# Patient Record
Sex: MALE | Race: WHITE | NOT HISPANIC OR LATINO | Employment: FULL TIME | URBAN - METROPOLITAN AREA
[De-identification: names, ages, dates, MRNs, and addresses within clinical notes are randomized per-mention and may not be internally consistent; named-entity substitution may affect disease eponyms.]

---

## 2021-04-30 ENCOUNTER — APPOINTMENT (OUTPATIENT)
Dept: GENERAL RADIOLOGY | Facility: HOSPITAL | Age: 61
End: 2021-04-30

## 2021-04-30 ENCOUNTER — HOSPITAL ENCOUNTER (INPATIENT)
Facility: HOSPITAL | Age: 61
LOS: 2 days | Discharge: HOME | End: 2021-05-02
Attending: EMERGENCY MEDICINE | Admitting: INTERNAL MEDICINE

## 2021-04-30 ENCOUNTER — APPOINTMENT (OUTPATIENT)
Dept: CT IMAGING | Facility: HOSPITAL | Age: 61
End: 2021-04-30

## 2021-04-30 DIAGNOSIS — Z79.01 CHRONIC ANTICOAGULATION: ICD-10-CM

## 2021-04-30 DIAGNOSIS — R42 DIZZINESS: ICD-10-CM

## 2021-04-30 DIAGNOSIS — R06.02 SHORTNESS OF BREATH: ICD-10-CM

## 2021-04-30 DIAGNOSIS — K92.2 GASTROINTESTINAL HEMORRHAGE, UNSPECIFIED GASTROINTESTINAL HEMORRHAGE TYPE: ICD-10-CM

## 2021-04-30 DIAGNOSIS — K25.9 GASTRIC ULCER: ICD-10-CM

## 2021-04-30 DIAGNOSIS — D62 ACUTE BLOOD LOSS ANEMIA: ICD-10-CM

## 2021-04-30 DIAGNOSIS — K62.5 BRBPR (BRIGHT RED BLOOD PER RECTUM): Primary | ICD-10-CM

## 2021-04-30 PROBLEM — I10 ESSENTIAL HYPERTENSION: Status: ACTIVE | Noted: 2021-04-30

## 2021-04-30 PROBLEM — R73.9 HYPERGLYCEMIA: Status: ACTIVE | Noted: 2021-04-30

## 2021-04-30 PROBLEM — I82.409 DVT (DEEP VENOUS THROMBOSIS) (HCC): Status: ACTIVE | Noted: 2021-04-30

## 2021-04-30 LAB
ABO GROUP BLD: NORMAL
ABO GROUP BLD: NORMAL
ALBUMIN SERPL-MCNC: 3.7 G/DL (ref 3.5–5.2)
ALBUMIN/GLOB SERPL: 1.4 G/DL
ALP SERPL-CCNC: 86 U/L (ref 39–117)
ALT SERPL W P-5'-P-CCNC: 11 U/L (ref 1–41)
ANION GAP SERPL CALCULATED.3IONS-SCNC: 11 MMOL/L (ref 5–15)
AST SERPL-CCNC: 16 U/L (ref 1–40)
BASOPHILS # BLD AUTO: 0.03 10*3/MM3 (ref 0–0.2)
BASOPHILS NFR BLD AUTO: 0.3 % (ref 0–1.5)
BILIRUB SERPL-MCNC: 0.3 MG/DL (ref 0–1.2)
BLD GP AB SCN SERPL QL: NEGATIVE
BUN SERPL-MCNC: 42 MG/DL (ref 6–20)
BUN/CREAT SERPL: 38.2 (ref 7–25)
CALCIUM SPEC-SCNC: 8.8 MG/DL (ref 8.6–10.5)
CHLORIDE SERPL-SCNC: 109 MMOL/L (ref 98–107)
CO2 SERPL-SCNC: 24 MMOL/L (ref 22–29)
CREAT SERPL-MCNC: 1.1 MG/DL (ref 0.76–1.27)
D-LACTATE SERPL-SCNC: 2.1 MMOL/L (ref 0.5–2)
DEPRECATED RDW RBC AUTO: 47.4 FL (ref 37–54)
DEVELOPER EXPIRATION DATE: ABNORMAL
DEVELOPER LOT NUMBER: ABNORMAL
EOSINOPHIL # BLD AUTO: 0.01 10*3/MM3 (ref 0–0.4)
EOSINOPHIL NFR BLD AUTO: 0.1 % (ref 0.3–6.2)
ERYTHROCYTE [DISTWIDTH] IN BLOOD BY AUTOMATED COUNT: 13.9 % (ref 12.3–15.4)
EXPIRATION DATE: ABNORMAL
FECAL OCCULT BLOOD SCREEN, POC: POSITIVE
FLUAV RNA RESP QL NAA+PROBE: NOT DETECTED
FLUBV RNA RESP QL NAA+PROBE: NOT DETECTED
GFR SERPL CREATININE-BSD FRML MDRD: 69 ML/MIN/1.73
GLOBULIN UR ELPH-MCNC: 2.6 GM/DL
GLUCOSE SERPL-MCNC: 156 MG/DL (ref 65–99)
HCT VFR BLD AUTO: 32.8 % (ref 37.5–51)
HGB BLD-MCNC: 10.1 G/DL (ref 13–17.7)
HOLD SPECIMEN: NORMAL
IMM GRANULOCYTES # BLD AUTO: 0.05 10*3/MM3 (ref 0–0.05)
IMM GRANULOCYTES NFR BLD AUTO: 0.5 % (ref 0–0.5)
IRON 24H UR-MRATE: 109 MCG/DL (ref 59–158)
IRON SATN MFR SERPL: 24 % (ref 20–50)
LYMPHOCYTES # BLD AUTO: 1.24 10*3/MM3 (ref 0.7–3.1)
LYMPHOCYTES NFR BLD AUTO: 11.8 % (ref 19.6–45.3)
Lab: ABNORMAL
MCH RBC QN AUTO: 28.4 PG (ref 26.6–33)
MCHC RBC AUTO-ENTMCNC: 30.8 G/DL (ref 31.5–35.7)
MCV RBC AUTO: 92.1 FL (ref 79–97)
MONOCYTES # BLD AUTO: 0.42 10*3/MM3 (ref 0.1–0.9)
MONOCYTES NFR BLD AUTO: 4 % (ref 5–12)
NEGATIVE CONTROL: NEGATIVE
NEUTROPHILS NFR BLD AUTO: 8.77 10*3/MM3 (ref 1.7–7)
NEUTROPHILS NFR BLD AUTO: 83.3 % (ref 42.7–76)
NRBC BLD AUTO-RTO: 0 /100 WBC (ref 0–0.2)
NT-PROBNP SERPL-MCNC: 49.7 PG/ML (ref 0–900)
PLATELET # BLD AUTO: 276 10*3/MM3 (ref 140–450)
PMV BLD AUTO: 10.9 FL (ref 6–12)
POSITIVE CONTROL: POSITIVE
POTASSIUM SERPL-SCNC: 4.5 MMOL/L (ref 3.5–5.2)
PROT SERPL-MCNC: 6.3 G/DL (ref 6–8.5)
QT INTERVAL: 316 MS
QTC INTERVAL: 431 MS
RBC # BLD AUTO: 3.56 10*6/MM3 (ref 4.14–5.8)
RH BLD: POSITIVE
RH BLD: POSITIVE
SARS-COV-2 RNA RESP QL NAA+PROBE: NOT DETECTED
SODIUM SERPL-SCNC: 144 MMOL/L (ref 136–145)
T&S EXPIRATION DATE: NORMAL
TIBC SERPL-MCNC: 454 MCG/DL (ref 298–536)
TRANSFERRIN SERPL-MCNC: 305 MG/DL (ref 200–360)
TROPONIN T SERPL-MCNC: <0.01 NG/ML (ref 0–0.03)
WBC # BLD AUTO: 10.52 10*3/MM3 (ref 3.4–10.8)
WHOLE BLOOD HOLD SPECIMEN: NORMAL
WHOLE BLOOD HOLD SPECIMEN: NORMAL

## 2021-04-30 PROCEDURE — 85018 HEMOGLOBIN: CPT | Performed by: INTERNAL MEDICINE

## 2021-04-30 PROCEDURE — 25010000002 IOPAMIDOL 61 % SOLUTION: Performed by: EMERGENCY MEDICINE

## 2021-04-30 PROCEDURE — 80053 COMPREHEN METABOLIC PANEL: CPT | Performed by: EMERGENCY MEDICINE

## 2021-04-30 PROCEDURE — 85025 COMPLETE CBC W/AUTO DIFF WBC: CPT

## 2021-04-30 PROCEDURE — 86901 BLOOD TYPING SEROLOGIC RH(D): CPT

## 2021-04-30 PROCEDURE — 74177 CT ABD & PELVIS W/CONTRAST: CPT

## 2021-04-30 PROCEDURE — 83880 ASSAY OF NATRIURETIC PEPTIDE: CPT | Performed by: EMERGENCY MEDICINE

## 2021-04-30 PROCEDURE — 87636 SARSCOV2 & INF A&B AMP PRB: CPT | Performed by: INTERNAL MEDICINE

## 2021-04-30 PROCEDURE — 82746 ASSAY OF FOLIC ACID SERUM: CPT | Performed by: PHYSICIAN ASSISTANT

## 2021-04-30 PROCEDURE — 93005 ELECTROCARDIOGRAM TRACING: CPT | Performed by: EMERGENCY MEDICINE

## 2021-04-30 PROCEDURE — 86923 COMPATIBILITY TEST ELECTRIC: CPT

## 2021-04-30 PROCEDURE — 99284 EMERGENCY DEPT VISIT MOD MDM: CPT

## 2021-04-30 PROCEDURE — 86901 BLOOD TYPING SEROLOGIC RH(D): CPT | Performed by: EMERGENCY MEDICINE

## 2021-04-30 PROCEDURE — 83540 ASSAY OF IRON: CPT | Performed by: PHYSICIAN ASSISTANT

## 2021-04-30 PROCEDURE — 86900 BLOOD TYPING SEROLOGIC ABO: CPT

## 2021-04-30 PROCEDURE — 82728 ASSAY OF FERRITIN: CPT | Performed by: PHYSICIAN ASSISTANT

## 2021-04-30 PROCEDURE — 93005 ELECTROCARDIOGRAM TRACING: CPT

## 2021-04-30 PROCEDURE — 85014 HEMATOCRIT: CPT | Performed by: INTERNAL MEDICINE

## 2021-04-30 PROCEDURE — 25010000002 DIPHENHYDRAMINE PER 50 MG: Performed by: INTERNAL MEDICINE

## 2021-04-30 PROCEDURE — 86900 BLOOD TYPING SEROLOGIC ABO: CPT | Performed by: EMERGENCY MEDICINE

## 2021-04-30 PROCEDURE — 84466 ASSAY OF TRANSFERRIN: CPT | Performed by: PHYSICIAN ASSISTANT

## 2021-04-30 PROCEDURE — 99223 1ST HOSP IP/OBS HIGH 75: CPT | Performed by: INTERNAL MEDICINE

## 2021-04-30 PROCEDURE — 82607 VITAMIN B-12: CPT | Performed by: PHYSICIAN ASSISTANT

## 2021-04-30 PROCEDURE — 82270 OCCULT BLOOD FECES: CPT | Performed by: EMERGENCY MEDICINE

## 2021-04-30 PROCEDURE — 86850 RBC ANTIBODY SCREEN: CPT | Performed by: EMERGENCY MEDICINE

## 2021-04-30 PROCEDURE — 84484 ASSAY OF TROPONIN QUANT: CPT | Performed by: EMERGENCY MEDICINE

## 2021-04-30 PROCEDURE — 71045 X-RAY EXAM CHEST 1 VIEW: CPT

## 2021-04-30 PROCEDURE — 83605 ASSAY OF LACTIC ACID: CPT | Performed by: PHYSICIAN ASSISTANT

## 2021-04-30 RX ORDER — PANTOPRAZOLE SODIUM 40 MG/10ML
80 INJECTION, POWDER, LYOPHILIZED, FOR SOLUTION INTRAVENOUS ONCE
Status: COMPLETED | OUTPATIENT
Start: 2021-04-30 | End: 2021-04-30

## 2021-04-30 RX ORDER — LISINOPRIL 20 MG/1
20 TABLET ORAL DAILY
COMMUNITY

## 2021-04-30 RX ORDER — ACETAMINOPHEN 325 MG/1
650 TABLET ORAL EVERY 4 HOURS PRN
Status: DISCONTINUED | OUTPATIENT
Start: 2021-04-30 | End: 2021-05-02 | Stop reason: HOSPADM

## 2021-04-30 RX ORDER — CHOLECALCIFEROL (VITAMIN D3) 125 MCG
5 CAPSULE ORAL NIGHTLY PRN
Status: DISCONTINUED | OUTPATIENT
Start: 2021-04-30 | End: 2021-05-02 | Stop reason: HOSPADM

## 2021-04-30 RX ORDER — SODIUM CHLORIDE, SODIUM LACTATE, POTASSIUM CHLORIDE, CALCIUM CHLORIDE 600; 310; 30; 20 MG/100ML; MG/100ML; MG/100ML; MG/100ML
100 INJECTION, SOLUTION INTRAVENOUS CONTINUOUS
Status: ACTIVE | OUTPATIENT
Start: 2021-05-01 | End: 2021-05-01

## 2021-04-30 RX ORDER — FAMOTIDINE 10 MG/ML
20 INJECTION, SOLUTION INTRAVENOUS ONCE
Status: COMPLETED | OUTPATIENT
Start: 2021-04-30 | End: 2021-04-30

## 2021-04-30 RX ORDER — ONDANSETRON 2 MG/ML
4 INJECTION INTRAMUSCULAR; INTRAVENOUS EVERY 6 HOURS PRN
Status: DISCONTINUED | OUTPATIENT
Start: 2021-04-30 | End: 2021-05-02 | Stop reason: HOSPADM

## 2021-04-30 RX ORDER — SODIUM CHLORIDE 0.9 % (FLUSH) 0.9 %
10 SYRINGE (ML) INJECTION AS NEEDED
Status: DISCONTINUED | OUTPATIENT
Start: 2021-04-30 | End: 2021-05-02 | Stop reason: HOSPADM

## 2021-04-30 RX ORDER — DIPHENHYDRAMINE HYDROCHLORIDE 50 MG/ML
25 INJECTION INTRAMUSCULAR; INTRAVENOUS NIGHTLY PRN
Status: DISCONTINUED | OUTPATIENT
Start: 2021-04-30 | End: 2021-05-02 | Stop reason: HOSPADM

## 2021-04-30 RX ORDER — SODIUM CHLORIDE 0.9 % (FLUSH) 0.9 %
10 SYRINGE (ML) INJECTION EVERY 12 HOURS SCHEDULED
Status: DISCONTINUED | OUTPATIENT
Start: 2021-05-01 | End: 2021-05-02 | Stop reason: HOSPADM

## 2021-04-30 RX ORDER — PANTOPRAZOLE SODIUM 40 MG/10ML
40 INJECTION, POWDER, LYOPHILIZED, FOR SOLUTION INTRAVENOUS EVERY 12 HOURS SCHEDULED
Status: DISCONTINUED | OUTPATIENT
Start: 2021-05-01 | End: 2021-05-02

## 2021-04-30 RX ADMIN — SODIUM CHLORIDE, PRESERVATIVE FREE 10 ML: 5 INJECTION INTRAVENOUS at 23:06

## 2021-04-30 RX ADMIN — IOPAMIDOL 95 ML: 612 INJECTION, SOLUTION INTRAVENOUS at 21:34

## 2021-04-30 RX ADMIN — PANTOPRAZOLE SODIUM 80 MG: 40 INJECTION, POWDER, FOR SOLUTION INTRAVENOUS at 21:06

## 2021-04-30 RX ADMIN — DIPHENHYDRAMINE HYDROCHLORIDE 25 MG: 50 INJECTION INTRAMUSCULAR; INTRAVENOUS at 23:22

## 2021-04-30 RX ADMIN — SODIUM CHLORIDE, POTASSIUM CHLORIDE, SODIUM LACTATE AND CALCIUM CHLORIDE 100 ML/HR: 600; 310; 30; 20 INJECTION, SOLUTION INTRAVENOUS at 23:06

## 2021-04-30 RX ADMIN — FAMOTIDINE 20 MG: 10 INJECTION, SOLUTION INTRAVENOUS at 21:05

## 2021-05-01 ENCOUNTER — ANESTHESIA (OUTPATIENT)
Dept: GASTROENTEROLOGY | Facility: HOSPITAL | Age: 61
End: 2021-05-01

## 2021-05-01 ENCOUNTER — ANESTHESIA EVENT (OUTPATIENT)
Dept: GASTROENTEROLOGY | Facility: HOSPITAL | Age: 61
End: 2021-05-01

## 2021-05-01 LAB
ANION GAP SERPL CALCULATED.3IONS-SCNC: 8 MMOL/L (ref 5–15)
BUN SERPL-MCNC: 39 MG/DL (ref 6–20)
BUN/CREAT SERPL: 41.5 (ref 7–25)
CA-I SERPL ISE-MCNC: 1.29 MMOL/L (ref 1.12–1.32)
CALCIUM SPEC-SCNC: 8.2 MG/DL (ref 8.6–10.5)
CHLORIDE SERPL-SCNC: 110 MMOL/L (ref 98–107)
CO2 SERPL-SCNC: 24 MMOL/L (ref 22–29)
CREAT SERPL-MCNC: 0.94 MG/DL (ref 0.76–1.27)
D-LACTATE SERPL-SCNC: 1.4 MMOL/L (ref 0.5–2)
FERRITIN SERPL-MCNC: 22.81 NG/ML (ref 30–400)
FOLATE SERPL-MCNC: 4.17 NG/ML (ref 4.78–24.2)
GFR SERPL CREATININE-BSD FRML MDRD: 82 ML/MIN/1.73
GLUCOSE SERPL-MCNC: 113 MG/DL (ref 65–99)
HBA1C MFR BLD: 5.8 % (ref 4.8–5.6)
HCT VFR BLD AUTO: 27.2 % (ref 37.5–51)
HCT VFR BLD AUTO: 27.6 % (ref 37.5–51)
HCT VFR BLD AUTO: 32.2 % (ref 37.5–51)
HGB BLD-MCNC: 8.4 G/DL (ref 13–17.7)
HGB BLD-MCNC: 8.5 G/DL (ref 13–17.7)
HGB BLD-MCNC: 9.8 G/DL (ref 13–17.7)
POTASSIUM SERPL-SCNC: 4.1 MMOL/L (ref 3.5–5.2)
SODIUM SERPL-SCNC: 142 MMOL/L (ref 136–145)
VIT B12 BLD-MCNC: <150 PG/ML (ref 211–946)

## 2021-05-01 PROCEDURE — 99254 IP/OBS CNSLTJ NEW/EST MOD 60: CPT | Performed by: NURSE PRACTITIONER

## 2021-05-01 PROCEDURE — 85018 HEMOGLOBIN: CPT | Performed by: INTERNAL MEDICINE

## 2021-05-01 PROCEDURE — 25010000002 PROPOFOL 10 MG/ML EMULSION: Performed by: NURSE ANESTHETIST, CERTIFIED REGISTERED

## 2021-05-01 PROCEDURE — 83605 ASSAY OF LACTIC ACID: CPT | Performed by: PHYSICIAN ASSISTANT

## 2021-05-01 PROCEDURE — 88305 TISSUE EXAM BY PATHOLOGIST: CPT | Performed by: INTERNAL MEDICINE

## 2021-05-01 PROCEDURE — 80048 BASIC METABOLIC PNL TOTAL CA: CPT | Performed by: PHYSICIAN ASSISTANT

## 2021-05-01 PROCEDURE — 83036 HEMOGLOBIN GLYCOSYLATED A1C: CPT | Performed by: PHYSICIAN ASSISTANT

## 2021-05-01 PROCEDURE — 99232 SBSQ HOSP IP/OBS MODERATE 35: CPT | Performed by: INTERNAL MEDICINE

## 2021-05-01 PROCEDURE — 0W3P8ZZ CONTROL BLEEDING IN GASTROINTESTINAL TRACT, VIA NATURAL OR ARTIFICIAL OPENING ENDOSCOPIC: ICD-10-PCS | Performed by: INTERNAL MEDICINE

## 2021-05-01 PROCEDURE — 85014 HEMATOCRIT: CPT | Performed by: INTERNAL MEDICINE

## 2021-05-01 PROCEDURE — 82330 ASSAY OF CALCIUM: CPT | Performed by: PHYSICIAN ASSISTANT

## 2021-05-01 PROCEDURE — 43255 EGD CONTROL BLEEDING ANY: CPT | Performed by: INTERNAL MEDICINE

## 2021-05-01 PROCEDURE — 25010000002 METOCLOPRAMIDE PER 10 MG: Performed by: PHYSICIAN ASSISTANT

## 2021-05-01 DEVICE — CLIPPING DEVICE
Type: IMPLANTABLE DEVICE | Site: STOMACH | Status: FUNCTIONAL
Brand: RESOLUTION CLIP

## 2021-05-01 RX ORDER — LIDOCAINE HYDROCHLORIDE 10 MG/ML
INJECTION, SOLUTION EPIDURAL; INFILTRATION; INTRACAUDAL; PERINEURAL AS NEEDED
Status: DISCONTINUED | OUTPATIENT
Start: 2021-05-01 | End: 2021-05-01 | Stop reason: SURG

## 2021-05-01 RX ORDER — SODIUM CHLORIDE, SODIUM LACTATE, POTASSIUM CHLORIDE, CALCIUM CHLORIDE 600; 310; 30; 20 MG/100ML; MG/100ML; MG/100ML; MG/100ML
INJECTION, SOLUTION INTRAVENOUS CONTINUOUS PRN
Status: DISCONTINUED | OUTPATIENT
Start: 2021-05-01 | End: 2021-05-01 | Stop reason: SURG

## 2021-05-01 RX ORDER — PROPOFOL 10 MG/ML
VIAL (ML) INTRAVENOUS AS NEEDED
Status: DISCONTINUED | OUTPATIENT
Start: 2021-05-01 | End: 2021-05-01 | Stop reason: SURG

## 2021-05-01 RX ORDER — HYDROCORTISONE ACETATE 25 MG/1
25 SUPPOSITORY RECTAL 2 TIMES DAILY
Status: DISCONTINUED | OUTPATIENT
Start: 2021-05-01 | End: 2021-05-02 | Stop reason: HOSPADM

## 2021-05-01 RX ORDER — ONDANSETRON 2 MG/ML
4 INJECTION INTRAMUSCULAR; INTRAVENOUS ONCE AS NEEDED
Status: DISCONTINUED | OUTPATIENT
Start: 2021-05-01 | End: 2021-05-01 | Stop reason: HOSPADM

## 2021-05-01 RX ORDER — METOCLOPRAMIDE HYDROCHLORIDE 5 MG/ML
10 INJECTION INTRAMUSCULAR; INTRAVENOUS ONCE
Status: COMPLETED | OUTPATIENT
Start: 2021-05-01 | End: 2021-05-01

## 2021-05-01 RX ADMIN — PANTOPRAZOLE SODIUM 40 MG: 40 INJECTION, POWDER, FOR SOLUTION INTRAVENOUS at 09:55

## 2021-05-01 RX ADMIN — PROPOFOL 100 MG: 10 INJECTION, EMULSION INTRAVENOUS at 11:50

## 2021-05-01 RX ADMIN — PROPOFOL 100 MG: 10 INJECTION, EMULSION INTRAVENOUS at 11:47

## 2021-05-01 RX ADMIN — METOCLOPRAMIDE 10 MG: 5 INJECTION, SOLUTION INTRAMUSCULAR; INTRAVENOUS at 22:51

## 2021-05-01 RX ADMIN — SODIUM CHLORIDE, PRESERVATIVE FREE 10 ML: 5 INJECTION INTRAVENOUS at 20:07

## 2021-05-01 RX ADMIN — LIDOCAINE HYDROCHLORIDE 100 MG: 10 INJECTION, SOLUTION EPIDURAL; INFILTRATION; INTRACAUDAL; PERINEURAL at 11:47

## 2021-05-01 RX ADMIN — SODIUM CHLORIDE 500 ML: 9 INJECTION, SOLUTION INTRAVENOUS at 22:50

## 2021-05-01 RX ADMIN — HYDROCORTISONE ACETATE 25 MG: 25 SUPPOSITORY RECTAL at 20:13

## 2021-05-01 RX ADMIN — PROPOFOL 100 MG: 10 INJECTION, EMULSION INTRAVENOUS at 11:54

## 2021-05-01 RX ADMIN — PROPOFOL 100 MG: 10 INJECTION, EMULSION INTRAVENOUS at 12:03

## 2021-05-01 RX ADMIN — ACETAMINOPHEN 650 MG: 325 TABLET ORAL at 18:51

## 2021-05-01 RX ADMIN — ACETAMINOPHEN 650 MG: 325 TABLET ORAL at 12:48

## 2021-05-01 RX ADMIN — PROPOFOL 100 MG: 10 INJECTION, EMULSION INTRAVENOUS at 11:58

## 2021-05-01 RX ADMIN — PANTOPRAZOLE SODIUM 40 MG: 40 INJECTION, POWDER, FOR SOLUTION INTRAVENOUS at 20:07

## 2021-05-01 RX ADMIN — SODIUM CHLORIDE, POTASSIUM CHLORIDE, SODIUM LACTATE AND CALCIUM CHLORIDE: 600; 310; 30; 20 INJECTION, SOLUTION INTRAVENOUS at 11:40

## 2021-05-01 RX ADMIN — SODIUM CHLORIDE, POTASSIUM CHLORIDE, SODIUM LACTATE AND CALCIUM CHLORIDE 1000 ML: 600; 310; 30; 20 INJECTION, SOLUTION INTRAVENOUS at 00:08

## 2021-05-01 NOTE — ANESTHESIA PROCEDURE NOTES
Peripheral IV    Patient location during procedure: pre-op  Line placed for Difficult Access.  Performed By   CRNA: Pacheco Jcakson CRNA  Preanesthetic Checklist  Completed: patient identified, IV checked, site marked, risks and benefits discussed, surgical consent, monitors and equipment checked, pre-op evaluation and timeout performed  Peripheral IV Prep   Patient position: supine   Prep: ChloraPrep  Peripheral IV Procedure   Laterality:left  Location:  Arm  Catheter size: 18 G         Post Assessment   Dressing Type: tape and transparent.    IV Dressing/Site: clean, dry and intact

## 2021-05-01 NOTE — PLAN OF CARE
Goal Outcome Evaluation:  VSS, room air, NSR on tele. AxOx4. Pt had 2 bright red Bms. EGD completed today per Dr. Duong. Regular diet. Pt c/o pain 1x, PRN administered. Pt rested well. Will continue to monitor.

## 2021-05-01 NOTE — ANESTHESIA POSTPROCEDURE EVALUATION
Patient: Sahil Motta    Procedure Summary     Date: 05/01/21 Room / Location:  [facility] /  [facility]    Anesthesia Start: 1140 Anesthesia Stop:     Procedure: ESOPHAGOGASTRODUODENOSCOPY WITH POSSIBLE PUSH ENTEROSCOPY (N/A ) Diagnosis:     Surgeons: Matheus Duong MD Provider: Nabil Baltazar MD    Anesthesia Type: MAC ASA Status: 2          Anesthesia Type: MAC    Vitals  No vitals data found for the desired time range.          Post Anesthesia Care and Evaluation    Patient location during evaluation: PACU  Patient participation: complete - patient participated  Level of consciousness: awake and alert  Pain management: adequate  Airway patency: patent  Anesthetic complications: No anesthetic complications  PONV Status: none  Cardiovascular status: hemodynamically stable and acceptable  Respiratory status: nonlabored ventilation, acceptable and nasal cannula  Hydration status: acceptable

## 2021-05-01 NOTE — ED PROVIDER NOTES
Subjective   The patient presents to the emergency department with shortness of breath, dizziness, abdominal cramping, and bright red blood per rectum.  Patient reports waking up this morning and had a bowel movement which was black tarry diarrhea.  He states that throughout the day he has been dizzy with standing as well as dyspnea on exertion.  Patient is on Eliquis secondary to history of DVTs.  His last dose was this morning.  Patient reports this afternoon he noted abdominal cramping in the lower abdomen with bright red blood per rectum and clots.  He does report some hemorrhoid pain recently.  Patient does report being involved in homosexual relationship but denies any history of anal intercourse.      History provided by:  Patient      Review of Systems   Constitutional: Negative.    Respiratory: Positive for shortness of breath.    Cardiovascular: Negative.    Gastrointestinal: Positive for abdominal pain and blood in stool. Negative for nausea and vomiting.   Musculoskeletal: Negative.    Skin: Negative.    Neurological: Positive for dizziness.   Psychiatric/Behavioral: Negative.    All other systems reviewed and are negative.      Past Medical History:   Diagnosis Date   • DVT (deep venous thrombosis) (CMS/HCC)    • Hypertension    • Kidney stone        Allergies   Allergen Reactions   • Percocet [Oxycodone-Acetaminophen]        Past Surgical History:   Procedure Laterality Date   • GASTRIC BYPASS         History reviewed. No pertinent family history.    Social History     Socioeconomic History   • Marital status:      Spouse name: Not on file   • Number of children: Not on file   • Years of education: Not on file   • Highest education level: Not on file   Tobacco Use   • Smoking status: Former Smoker     Quit date: 4/30/2011     Years since quitting: 10.0   • Smokeless tobacco: Never Used   Substance and Sexual Activity   • Alcohol use: Yes     Comment: occ           Objective   Physical  Exam  Vitals and nursing note reviewed.   Constitutional:       Appearance: He is well-developed. He is obese.   HENT:      Head: Normocephalic and atraumatic.   Cardiovascular:      Rate and Rhythm: Regular rhythm. Tachycardia present.      Pulses: Normal pulses.   Pulmonary:      Effort: Pulmonary effort is normal. No tachypnea.      Breath sounds: Normal breath sounds.   Abdominal:      Palpations: Abdomen is soft.      Tenderness: There is no abdominal tenderness. There is no guarding.   Musculoskeletal:         General: Normal range of motion.      Cervical back: Normal range of motion.      Right lower leg: No tenderness. No edema.      Left lower leg: No tenderness. No edema.   Skin:     General: Skin is warm and dry.      Capillary Refill: Capillary refill takes less than 2 seconds.   Neurological:      General: No focal deficit present.      Mental Status: He is alert and oriented to person, place, and time.   Psychiatric:         Mood and Affect: Mood normal.         Behavior: Behavior normal.         Procedures           ED Course  ED Course as of May 01 0037   Fri Apr 30, 2021   1958 Hemoglobin(!): 10.1 [RS]   2118 Troponin T: <0.010 [RS]   2202 Patient with symptomatic GI bleeding with bright red blood per rectum.  Patient had labs done 9 days ago showing a hemoglobin of 12.7.  We will plan admission to the hospital for further evaluation management.  Hospitalist paged for admission.    [RS]   2209 Case discussed with Dr. Calderon who will admit.    [RS]   2216 I updated the patient and his partner on the findings and plan.  They voiced understanding and agree.    [RS]      ED Course User Index  [RS] Connor Johnston MD                                           MDM  Number of Diagnoses or Management Options     Amount and/or Complexity of Data Reviewed  Clinical lab tests: reviewed  Tests in the radiology section of CPT®: reviewed  Discuss the patient with other providers: yes  Independent  visualization of images, tracings, or specimens: yes        Final diagnoses:   BRBPR (bright red blood per rectum)   Gastrointestinal hemorrhage, unspecified gastrointestinal hemorrhage type   Dizziness   Shortness of breath   Chronic anticoagulation   Acute blood loss anemia       ED Disposition  ED Disposition     ED Disposition Condition Comment    Decision to Admit  Level of Care: Telemetry [5]   Diagnosis: BRBPR (bright red blood per rectum) [[id number]]   Admitting Physician: RAMONA SÁNCHEZ [[id number]]   Attending Physician: RAMONA SÁNCHEZ [[id number]]   Bed Request Comments: tele            No follow-up provider specified.       Medication List      No changes were made to your prescriptions during this visit.          Connor Johnston MD  05/01/21 0037

## 2021-05-01 NOTE — H&P
"    [facility] Medicine Services  HISTORY AND PHYSICAL    Patient Name: Sahil Motta  : 1960  MRN: [id number]  Primary Care Physician: Khris Gallagher MD  Date of admission: 2021    Subjective   Subjective     Chief Complaint:  Rectal bleeding    HPI:  Sahil Motta is a 60 y.o. 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 He presents today with complaints of rectal bleeding. Patient states he awoke this morning and passed a dark \"muddy\" stool. States as the the day progressed stool transitioned to bright red with clots. He notes associated dyspnea on exertion, nausea without vomiting, and dizziness/light headedness upon standing. Just prior to arrival patient notes some mild abdominal diffuse abdominal cramping that has resolved. States he was unable to complete household chores today due to symptoms. Got concerned and came in.     Of note, patient volunteers a history of hemorrhoids for which he has been experiencing rectal pain. States this has been improved with hemorrhoid creams and increasing fiber in diet. Patient reports last colonoscopy was 3 years ago wherein a benign polyp was removed. EGD was done in  secondary to esophageal stricture related to gastric bypass. Currently there are no complaints of fever, cough, congestion, or chest pain. No syncope ow lower extremity pain or swelling. No nausea, vomiting, diarrhea, or constipation. Patient does report being involved in homosexual relationship but denies any history of anal intercourse. He was diagnosed with COVID in 2020 and is fully vaccinated as of 2021. Will admit to inpatient.      COVID Details:    Symptoms:    [] NONE [] Fever []  Cough [x] Shortness of breath [] Change in taste/smell    Review of Systems   Constitutional: Negative for chills and fever.   HENT: Negative for congestion and trouble swallowing.    Eyes: " Negative for photophobia and visual disturbance.   Respiratory: Positive for shortness of breath. Negative for cough.    Cardiovascular: Negative for chest pain and leg swelling.   Gastrointestinal: Positive for abdominal pain, anal bleeding and blood in stool. Negative for diarrhea, nausea and vomiting.   Endocrine: Negative for cold intolerance and heat intolerance.   Genitourinary: Negative for flank pain and frequency.   Musculoskeletal: Negative for back pain and gait problem.   Skin: Negative for pallor and rash.   Allergic/Immunologic: Negative for immunocompromised state.   Neurological: Positive for dizziness, weakness and light-headedness.   Hematological: Negative for adenopathy.   Psychiatric/Behavioral: Negative for agitation and confusion.        All other systems reviewed and are negative.     Personal History     Past Medical History:   Diagnosis Date   • DVT (deep venous thrombosis) (CMS/HCC)    • Hypertension    • Kidney stone        Past Surgical History:   Procedure Laterality Date   • GASTRIC BYPASS         Family History: family history is not on file. Otherwise pertinent FHx was reviewed and unremarkable.     Social History:  reports that he quit smoking about 10 years ago. He has never used smokeless tobacco. He reports current alcohol use.  Social History     Social History Narrative   • Not on file       Medications:  Apixaban, Calcium Polycarbophil, FLUoxetine HCl, lisinopril, and olmesartan    Allergies   Allergen Reactions   • Percocet [Oxycodone-Acetaminophen]        Objective   Objective     Vital Signs:   Temp:  [97.7 °F (36.5 °C)] 97.7 °F (36.5 °C)  Heart Rate:  [] 93  Resp:  [18] 18  BP: (106-136)/(81-99) 106/81    Physical Exam   Constitutional: Awake, alert  Eyes: PERRLA, sclerae anicteric, no conjunctival injection  HENT: NCAT, mucous membranes moist  Neck: Supple, no thyromegaly, no lymphadenopathy, trachea midline  Respiratory: Clear to auscultation bilaterally,  nonlabored respirations   Cardiovascular: RRR, no murmurs, rubs, or gallops, palpable pedal pulses bilaterally  Gastrointestinal: Positive bowel sounds, soft, nontender, nondistended  Musculoskeletal: No bilateral ankle edema, no clubbing or cyanosis to extremities  Psychiatric: Appropriate affect, cooperative  Neurologic: Oriented x 3, strength symmetric in all extremities, Cranial Nerves grossly intact to confrontation, speech clear  Skin: No rashes      Results Reviewed:  I have personally reviewed most recent indicated data and agree with findings including:  [x]  Laboratory  []  Radiology  []  EKG/Telemetry  []  Pathology  []  Cardiac/Vascular Studies  [x]  Old records  []  Other:  Most pertinent findings include: vitals stable. Glucose 156. BUN 42. H/H 10.1 and 32.8 respectively. Chemistry and hematology otherwise favorable. Heme occult positive. EKG showed sinus tachycardia.       LAB RESULTS:      Lab 04/30/21 1922   WBC 10.52   HEMOGLOBIN 10.1*   HEMATOCRIT 32.8*   PLATELETS 276   NEUTROS ABS 8.77*   IMMATURE GRANS (ABS) 0.05   LYMPHS ABS 1.24   MONOS ABS 0.42   EOS ABS 0.01   MCV 92.1         Lab 04/30/21 1922   SODIUM 144   POTASSIUM 4.5   CHLORIDE 109*   CO2 24.0   ANION GAP 11.0   BUN 42*   CREATININE 1.10   GLUCOSE 156*   CALCIUM 8.8         Lab 04/30/21 1922   TOTAL PROTEIN 6.3   ALBUMIN 3.70   GLOBULIN 2.6   ALT (SGPT) 11   AST (SGOT) 16   BILIRUBIN 0.3   ALK PHOS 86         Lab 04/30/21 1922   PROBNP 49.7   TROPONIN T <0.010             Lab 04/30/21 2039 04/30/21 1922   ABO TYPING A A   RH TYPING Positive Positive   ANTIBODY SCREEN  --  Negative         Brief Urine Lab Results     None        Microbiology Results (last 10 days)     ** No results found for the last 240 hours. **          CT Abdomen Pelvis With Contrast    Result Date: 4/30/2021  CT Abdomen Pelvis W INDICATION: Abdominal cramping. Bright red blood per rectum. In the emergency compartment. TECHNIQUE: CT of the abdomen and pelvis  with IV contrast. Coronal and sagittal reconstructions were obtained.  Radiation dose reduction techniques included automated exposure control or exposure modulation based on body size. Count of known CT and cardiac nuc med studies performed in previous 12 months: 0. COMPARISON: None available. FINDINGS: Abdomen: Included lung bases are clear. Normal caliber aorta. There is atherosclerotic change. Postop changes involving the stomach. Spleen adrenal glands and pancreas are negative and the gallbladder unremarkable. Mild hepatic steatosis. Kidneys unremarkable. No adenopathy. Pelvis: Negative bladder and prostate gland is unremarkable. No drainable fluid collection. Diverticulosis. The bowel is nonobstructed and there is no focal inflammatory change in the bowel. There is redundancy of the ascending colon. Appendix not clearly identified but no secondary signs of appendicitis or inflammatory change within the right lower quadrant/midabdomen. Negative inguinal canals. No suspicious bone lesion. Delayed phase images demonstrate no additional findings.  image negative.     Impression: 1. No clearly acute process in the abdomen or pelvis. Diverticulosis but no CT evidence of acute diverticulitis. Appendix not identified but no secondary signs of appendicitis. 2. Status post gastric surgery. 3. Hepatic steatosis. Signer Name: Devyn Blanchard MD  Signed: 4/30/2021 9:48 PM  Workstation Name: [id number]  [facility]    XR Chest 1 View    Result Date: 4/30/2021  CR Chest 1 Vw INDICATION: Shortness of breath COMPARISON:  None available. FINDINGS: Single portable AP view(s) of the chest.  The heart and mediastinal contours are normal. The lungs are clear. No pneumothorax or pleural effusion.     Impression: No acute cardiopulmonary findings. Signer Name: Dinah Lloyd MD  Signed: 4/30/2021 8:21 PM  Workstation Name: [id number]  [facility]          Assessment/Plan   Assessment &  "Plan       GI bleed    Hyperglycemia    DVT (deep venous thrombosis) (CMS/[facility])    Essential hypertension    Chronic anticoagulation      1. GI bleed:  - on Eliquis. Hold. Also with history of hemorrhoids. Heme occult positive.  - hemoglobin had reportedly dropped from > 12 down to 10.1 now  - obtain iron studies  - started on protonix 40 mg q 12 hours  - IVF  - trend H/H every 6 hours. If drops significantly can reverse with Kcentra and transfuse as needed  - NPO  - consult to GI  - am labs    2. Hyperglycemia:  - no history of DM  - check a1c    3. Hypertension:  - holding lisinopril. BP labile. Resume as tolerated    4. 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 Holding as above.  - last DVT > 1 year ago and felt to be provoked from long travel    DVT prophylaxis:  mechanical    CODE STATUS:  Full code  Code Status and Medical Interventions:   Ordered at: 04/30/21 2226     Level Of Support Discussed With:    Patient     Code Status:    CPR     Medical Interventions (Level of Support Prior to Arrest):    Full       This note has been completed as part of a split-shared workflow.     Electronically signed by Favio Chavez PA-C, 04/30/21, 10:38 PM EDT.        Attending   Admission Attestation       I have seen and examined the patient, performing an independent face-to-face diagnostic evaluation with plan of care reviewed and developed with the advanced practice clinician (APC).      Brief Summary Statement:   Sahil Motta is a 60 y.o. male with past medical history of hypertension, recurrent DVT felt to be from prolonged travel, chronic anticoagulation with Eliquis who presented to the ER with complaints of GI bleed.    Patient states he woke this morning with dark \"muddy\" stool.  He states that as the day progressed he began having more maroon looking stool with clotting.  He stated progressively became more bright red and color.  He reports " dyspnea, nausea, dizziness and lightheadedness.  He became concerned that he might be having a PE but reports that he has been compliant with his medications including Eliquis which he took this morning.  He came to the ER for further evaluation.  Had another bright red bloody bowel movement in the ER.  No recurrence since that time.  He has had mild crampy abdominal pain that is generalized.    Patient does report that he has hemorrhoids and has been using hemorrhoidal cream.  He reports history of gastric bypass and history of esophageal stricture.    Remainder of detailed HPI is as noted by APC and has been reviewed and/or edited by me for completeness.    Attending Physical Exam:  Constitutional: Awake, alert  Eyes: PERRLA, sclerae anicteric, no conjunctival injection  HENT: NCAT, mucous membranes moist  Neck: Supple, no thyromegaly, no lymphadenopathy, trachea midline  Respiratory: Clear to auscultation bilaterally, nonlabored respirations   Cardiovascular: tachy, regular, no murmurs, rubs, or gallops, palpable pedal pulses bilaterally  Gastrointestinal: Positive bowel sounds, soft, nontender, nondistended  Musculoskeletal: No bilateral ankle edema, no clubbing or cyanosis to extremities  Psychiatric: Appropriate affect, cooperative  Neurologic: Oriented x 3, strength symmetric in all extremities, Cranial Nerves grossly intact to confrontation, speech clear  Skin: No rashes      Brief Assessment/Plan :  See detailed assessment and plan developed with APC which I have reviewed and/or edited for completeness.        Admission Status: I believe that this patient meets INPATIENT status due to GI bleed.  I feel patient’s risk for adverse outcomes and need for care warrant INPATIENT evaluation and I predict the patient’s care encounter to likely last beyond 2 midnights.        Malick Li,   04/30/21

## 2021-05-01 NOTE — PLAN OF CARE
Goal Outcome Evaluation:  Plan of Care Reviewed With: patient  Progress: improving  Outcome Summary: Pt arrived from ED this shift. Very pleasant resting comfortably. No complaints of pain. NSR on monitor. RA. 1L bolus given. No other complaint at this time.

## 2021-05-01 NOTE — ANESTHESIA PREPROCEDURE EVALUATION
Anesthesia Evaluation     Patient summary reviewed and Nursing notes reviewed   NPO Solid Status: > 8 hours  NPO Liquid Status: > 8 hours           Airway   Mallampati: I  TM distance: >3 FB  Neck ROM: full  No difficulty expected  Dental          Pulmonary     breath sounds clear to auscultation  Cardiovascular   Exercise tolerance: good (4-7 METS)    Rhythm: regular  Rate: normal        Neuro/Psych  GI/Hepatic/Renal/Endo      Musculoskeletal     Abdominal    Substance History      OB/GYN          Other                        Anesthesia Plan    ASA 2     MAC     intravenous induction     Anesthetic plan, all risks, benefits, and alternatives have been provided, discussed and informed consent has been obtained with: patient.

## 2021-05-01 NOTE — POST-PROCEDURE NOTE
EGD/PUSH   2 ulcers in pouch.  Clips X 2.  Push to pylorus was normal.  Unable to enter through pylorus but mucosa looked james      Bx for H pylori    Observe overnight and if stable DC in am  FU with Dr Hanna re colonoscopy if needed

## 2021-05-01 NOTE — CONSULTS
[facility] Consult    Referring Provider: No ref. provider found    PCP: Khris Gallagher MD    Reason for Consultation: Gastrointestinal bleeding    Chief complaint: GI bleeding    History of present illness:    Sahil Motta is a 60 y.o. male who is admitted with a 1 day onset of GI bleeding.  Patient notes he was at his usual state of health until yesterday morning when he began to experience dark stools; notes of following onset he began to experience worsening weakness, fatigue, exertional dyspnea, nausea without vomiting, and lightheadedness-notes he was attempting to do laundry but was unable to due to the fatigue and had to sit down.  Does not endorse any chest pain, fever/chills, or known sick contacts.  Patient's GI history is significant for a Emma-en-Y gastric bypass.  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  Does endorse that his hemorrhoids has been causing problems over the past few weeks with itching and burning but he largely attributes this to not drying off after use of his bidet.  Denies any history of GI bleeding.  Past medical, surgical, social, family history is reviewed.  No documented alleviating or exacerbating factors.  Not dorsally pain at this time.    Last Colonoscopy: Approximately 3 years ago; single polypectomy performed.  Last EGD: 2013 per Dr. Hines;    Allergies:  Percocet [oxycodone-acetaminophen]    Scheduled Meds:  pantoprazole, 40 mg, Intravenous, Q12H  sodium chloride, 10 mL, Intravenous, Q12H         Infusions:  lactated ringers, 100 mL/hr, Last Rate: 100 mL/hr (04/30/21 4685)        PRN Meds:  •  acetaminophen  •  diphenhydrAMINE  •  melatonin  •  ondansetron  •  sodium chloride  •  sodium chloride    Home Meds:  Medications Prior to Admission   Medication Sig Dispense Refill Last Dose   • Calcium Polycarbophil  (FIBER-CAPS PO) Take 1 capsule by mouth.      • lisinopril (PRINIVIL,ZESTRIL) 20 MG tablet Take 20 mg by mouth Daily.      • Apixaban (ELIQUIS PO) Take 5 mg by mouth 2 (two) times a day.      • FLUoxetine HCl (PROZAC PO) Take  by mouth.      • olmesartan (BENICAR) 20 MG tablet Take 20 mg by mouth Daily.          ROS: Review of Systems   Constitutional: Positive for activity change, diaphoresis and fatigue. Negative for appetite change, chills, fever and unexpected weight change.   HENT: Negative for sore throat, trouble swallowing and voice change.    Eyes: Negative.    Respiratory: Negative for apnea, cough, choking, chest tightness, shortness of breath, wheezing and stridor.    Cardiovascular: Negative for chest pain, palpitations and leg swelling.   Gastrointestinal: Positive for abdominal pain, blood in stool, diarrhea, nausea and rectal pain. Negative for abdominal distention, anal bleeding, constipation and vomiting.   Endocrine: Negative.    Genitourinary: Negative.    Musculoskeletal: Negative.    Skin: Negative for color change, pallor, rash and wound.   Allergic/Immunologic: Negative.    Neurological: Negative.    Hematological: Negative for adenopathy. Does not bruise/bleed easily.   Psychiatric/Behavioral: Negative.    All other systems reviewed and are negative.      PAST MED HX:  Past Medical History:   Diagnosis Date   • DVT (deep venous thrombosis) (CMS/HCC)    • Hypertension    • Kidney stone        PAST SURG HX:  Past Surgical History:   Procedure Laterality Date   • GASTRIC BYPASS         FAM HX:  History reviewed. No pertinent family history.    SOC HX:  Social History     Socioeconomic History   • Marital status:      Spouse name: Not on file   • Number of children: Not on file   • Years of education: Not on file   • Highest education level: Not on file   Tobacco Use   • Smoking status: Former Smoker     Quit date: 4/30/2011     Years since quitting: 10.0   • Smokeless tobacco: Never Used  "  Substance and Sexual Activity   • Alcohol use: Yes     Comment: occ       PHYSICAL EXAM  /97   Pulse 105   Temp 98 °F (36.7 °C) (Oral)   Resp 20   Ht 175.3 cm (69\")   Wt 118 kg (259 lb 3.2 oz)   SpO2 97%   BMI 38.28 kg/m²   Wt Readings from Last 3 Encounters:   04/30/21 118 kg (259 lb 3.2 oz)   02/17/17 116 kg (256 lb)   03/22/14 104 kg (230 lb 0.1 oz)   ,body mass index is 38.28 kg/m².  Physical Exam  Vitals and nursing note reviewed.   Constitutional:       General: He is not in acute distress.     Appearance: Normal appearance. He is obese. He is not ill-appearing or toxic-appearing.      Comments: Pleasantly conversant, no acute distress.   HENT:      Head: Normocephalic and atraumatic.      Mouth/Throat:      Mouth: Mucous membranes are moist.      Pharynx: Oropharynx is clear. No oropharyngeal exudate.   Eyes:      General: No scleral icterus.     Extraocular Movements: Extraocular movements intact.      Conjunctiva/sclera: Conjunctivae normal.      Pupils: Pupils are equal, round, and reactive to light.   Cardiovascular:      Rate and Rhythm: Normal rate and regular rhythm.      Pulses: Normal pulses.      Heart sounds: Normal heart sounds.   Pulmonary:      Effort: Pulmonary effort is normal. No respiratory distress.      Breath sounds: Normal breath sounds.   Abdominal:      General: Abdomen is flat. Bowel sounds are normal. There is no distension.      Palpations: Abdomen is soft. There is no mass.      Tenderness: There is abdominal tenderness. There is no guarding or rebound.      Hernia: No hernia is present.   Genitourinary:     Rectum: Guaiac result positive.   Musculoskeletal:         General: Normal range of motion.      Cervical back: Normal range of motion and neck supple. No rigidity or tenderness.      Right lower leg: No edema.      Left lower leg: No edema.   Lymphadenopathy:      Cervical: No cervical adenopathy.   Skin:     General: Skin is warm and dry.      Capillary Refill: " Capillary refill takes less than 2 seconds.      Coloration: Skin is pale. Skin is not jaundiced.   Neurological:      General: No focal deficit present.      Mental Status: He is alert and oriented to person, place, and time.   Psychiatric:         Mood and Affect: Mood normal.         Behavior: Behavior normal.         Thought Content: Thought content normal.         Judgment: Judgment normal.         Results Review:   I reviewed the patient's new clinical results.  I reviewed the patient's new imaging results and agree with the interpretation.  I personally viewed and interpreted the patient's EKG/Telemetry data    Lab Results   Component Value Date    WBC 10.52 04/30/2021    HGB 8.5 (L) 05/01/2021    HGB 9.8 (L) 04/30/2021    HGB 10.1 (L) 04/30/2021    HCT 27.2 (L) 05/01/2021    MCV 92.1 04/30/2021     04/30/2021       No results found for: INR    Lab Results   Component Value Date    GLUCOSE 113 (H) 05/01/2021    BUN 39 (H) 05/01/2021    CREATININE 0.94 05/01/2021    EGFRIFNONA 82 05/01/2021    BCR 41.5 (H) 05/01/2021     05/01/2021    K 4.1 05/01/2021    CO2 24.0 05/01/2021    CALCIUM 8.2 (L) 05/01/2021    ALBUMIN 3.70 04/30/2021    ALKPHOS 86 04/30/2021    BILITOT 0.3 04/30/2021    ALT 11 04/30/2021    AST 16 04/30/2021     CT Abdomen Pelvis With Contrast  Result Date: 4/30/2021  CT Abdomen Pelvis W INDICATION: Abdominal cramping. Bright red blood per rectum. In the emergency compartment. TECHNIQUE: CT of the abdomen and pelvis with IV contrast. Coronal and sagittal reconstructions were obtained.  Radiation dose reduction techniques included automated exposure control or exposure modulation based on body size. Count of known CT and cardiac nuc med studies performed in previous 12 months: 0. COMPARISON: None available. FINDINGS: Abdomen: Included lung bases are clear. Normal caliber aorta. There is atherosclerotic change. Postop changes involving the stomach. Spleen adrenal glands and pancreas are  negative and the gallbladder unremarkable. Mild hepatic steatosis. Kidneys unremarkable. No adenopathy. Pelvis: Negative bladder and prostate gland is unremarkable. No drainable fluid collection. Diverticulosis. The bowel is nonobstructed and there is no focal inflammatory change in the bowel. There is redundancy of the ascending colon. Appendix not clearly identified but no secondary signs of appendicitis or inflammatory change within the right lower quadrant/midabdomen. Negative inguinal canals. No suspicious bone lesion. Delayed phase images demonstrate no additional findings.  image negative.     1. No clearly acute process in the abdomen or pelvis. Diverticulosis but no CT evidence of acute diverticulitis. Appendix not identified but no secondary signs of appendicitis. 2. Status post gastric surgery. 3. Hepatic steatosis. Signer Name: Devyn Blanchard MD  Signed: 4/30/2021 9:48 PM  Workstation Name: [id number]  [facility]    XR Chest 1 View  Result Date: 4/30/2021  CR Chest 1 Vw INDICATION: Shortness of breath COMPARISON:  None available. FINDINGS: Single portable AP view(s) of the chest.  The heart and mediastinal contours are normal. The lungs are clear. No pneumothorax or pleural effusion.     No acute cardiopulmonary findings. Signer Name: Dinah Llyod MD  Signed: 4/30/2021 8:21 PM  Workstation Name: [id number]  [facility]    COVID19   Date Value Ref Range Status   04/30/2021 Not Detected Not Detected - Ref. Range Final     ASSESSMENTS/PLANS  1. Acute gastrointestinal bleeding of suspected upper source   2. Acute blood loss anemia  3. NSAID use  4. Hx of Emma En-Y gastric bypass   5. Hx of DVT, on anticoagulation  6. Chronic anticoagulation, related to #5    Sahil Motta is a 60 y.o. male admitted to hospital with a one day onset of acute gastrointestinal bleeding.  Patient reports being chronically anticoagulate d/t DVT and has been taking NSAIDs for  chronic lower back pain. GI hx significant for a gastric bypass.  Given history and elevated BUN, likely patient has an upper gastrointestinal bleeding source; will start with EGD and possible push enteroscopy today- if negative, colonoscopy tomorrow.     >>> Recommend EGD with possible push enteroscopy today; if negative c-scope tomorrow.   >>> NPO  >>> Obtain informed consent for EGD  >>> continue to monitor H/H and transfuse for hemoglobins less than 7 or symptomatic anemia per protocol  >>> Given NSAID use and hx of Emma En-Y bypass, GI Prophylaxis with IV PPI BID.   >>> Annusol 25mg rectally bid x 5 days for hemorrhoids.   >>> Patient instructed to start FiberCon 2 tablets daily at discharge.    I discussed the patient's findings and my recommendations with patient, family and nursing staff    SOFYIA Motta  05/01/21  09:26 EDT

## 2021-05-01 NOTE — PROGRESS NOTES
[facility] Medicine Services  PROGRESS NOTE    Patient Name: Sahil Motta  : 1960  MRN: [id number]    Date of Admission: 2021  Primary Care Physician: Khris Gallagher MD    Subjective   Subjective     CC:  F/u GIB    HPI:  Patient sitting up in bed. Still having several bloody bowel movements and abdominal cramping this morning. Takes Eliquis for hx of 3 DVTs, has not had one in several years. Was having shortness of breath and tunnel vision at home prior to admission.    ROS:  Gen- No fevers, chills  CV- No chest pain, palpitations  Resp- No cough, dyspnea  GI- No N/V/D, +abd pain    Objective   Objective     Vital Signs:   Temp:  [97.7 °F (36.5 °C)-98 °F (36.7 °C)] 98 °F (36.7 °C)  Heart Rate:  [] 105  Resp:  [18-20] 20  BP: (106-136)/(79-99) 126/97        Physical Exam:  Constitutional: No acute distress, awake, alert  HENT: NCAT, mucous membranes moist  Respiratory: Clear to auscultation bilaterally, respiratory effort normal   Cardiovascular: RRR, no murmurs, rubs, or gallops  Gastrointestinal: Positive bowel sounds, soft, nontender, nondistended  Musculoskeletal: No bilateral ankle edema  Psychiatric: Appropriate affect, cooperative  Neurologic: Oriented x 3, strength symmetric in all extremities, Cranial Nerves grossly intact to confrontation, speech clear  Skin: No rashes      Results Reviewed:  Results from last 7 days   Lab Units 21  0914 219 212   WBC 10*3/mm3  --   --   --  10.52   HEMOGLOBIN g/dL 8.4* 8.5* 9.8* 10.1*   HEMATOCRIT % 27.6* 27.2* 32.2* 32.8*   PLATELETS 10*3/mm3  --   --   --  276     Results from last 7 days   Lab Units 21  1922   SODIUM mmol/L 142 144   POTASSIUM mmol/L 4.1 4.5   CHLORIDE mmol/L 110* 109*   CO2 mmol/L 24.0 24.0   BUN mg/dL 39* 42*   CREATININE mg/dL 0.94 1.10   GLUCOSE mg/dL 113* 156*   CALCIUM mg/dL 8.2* 8.8   ALT (SGPT) U/L  --  11   AST (SGOT) U/L  --  16    TROPONIN T ng/mL  --  <0.010   PROBNP pg/mL  --  49.7     Estimated Creatinine Clearance: 107.2 mL/min (by C-G formula based on SCr of 0.94 mg/dL).    Microbiology Results Abnormal     Procedure Component Value - Date/Time    COVID PRE-OP / PRE-PROCEDURE SCREENING ORDER (NO ISOLATION) - Swab, Nasopharynx [[id number]]  (Normal) Collected: 04/30/21 2219    Lab Status: Final result Specimen: Swab from Nasopharynx Updated: 04/30/21 2315    Narrative:      The following orders were created for panel order COVID PRE-OP / PRE-PROCEDURE SCREENING ORDER (NO ISOLATION) - Swab, Nasopharynx.  Procedure                               Abnormality         Status                     ---------                               -----------         ------                     COVID-19 and FLU A/B PCR...[[id number]]  Normal              Final result                 Please view results for these tests on the individual orders.    COVID-19 and FLU A/B PCR - Swab, Nasopharynx [[id number]]  (Normal) Collected: 04/30/21 2219    Lab Status: Final result Specimen: Swab from Nasopharynx Updated: 04/30/21 2315     COVID19 Not Detected     Influenza A PCR Not Detected     Influenza B PCR Not Detected    Narrative:      Fact sheet for providers: https://www.fda.gov/media/124269/download    Fact sheet for patients: https://www.fda.gov/media/139378/download    Test performed by PCR.          Imaging Results (Last 24 Hours)     Procedure Component Value Units Date/Time    CT Abdomen Pelvis With Contrast [[id number]] Collected: 04/30/21 2148     Updated: 04/30/21 2150    Narrative:      CT Abdomen Pelvis W    INDICATION:   Abdominal cramping. Bright red blood per rectum. In the emergency compartment.    TECHNIQUE:   CT of the abdomen and pelvis with IV contrast. Coronal and sagittal reconstructions were obtained.  Radiation dose reduction techniques included automated exposure control or exposure modulation based on body size. Count of known CT and cardiac  nuc med  studies performed in previous 12 months: 0.     COMPARISON:   None available.    FINDINGS:  Abdomen: Included lung bases are clear. Normal caliber aorta. There is atherosclerotic change. Postop changes involving the stomach. Spleen adrenal glands and pancreas are negative and the gallbladder unremarkable. Mild hepatic steatosis. Kidneys  unremarkable. No adenopathy.    Pelvis: Negative bladder and prostate gland is unremarkable. No drainable fluid collection. Diverticulosis. The bowel is nonobstructed and there is no focal inflammatory change in the bowel. There is redundancy of the ascending colon. Appendix not  clearly identified but no secondary signs of appendicitis or inflammatory change within the right lower quadrant/midabdomen. Negative inguinal canals. No suspicious bone lesion. Delayed phase images demonstrate no additional findings.  image  negative.      Impression:        1. No clearly acute process in the abdomen or pelvis. Diverticulosis but no CT evidence of acute diverticulitis. Appendix not identified but no secondary signs of appendicitis.  2. Status post gastric surgery.  3. Hepatic steatosis.          Signer Name: Devyn Blanchard MD   Signed: 4/30/2021 9:48 PM   [email or web address]    [facility]    XR Chest 1 View [[id number]] Collected: 04/30/21 2021     Updated: 04/30/21 2023    Narrative:      CR Chest 1 Vw    INDICATION:   Shortness of breath    COMPARISON:    None available.    FINDINGS:  Single portable AP view(s) of the chest.  The heart and mediastinal contours are normal. The lungs are clear. No pneumothorax or pleural effusion.      Impression:      No acute cardiopulmonary findings.    Signer Name: Dinah Lloyd MD   Signed: 4/30/2021 8:21 PM   Workstation Name: [id number]    [facility]              I have reviewed the medications:  Scheduled Meds:pantoprazole, 40 mg, Intravenous, Q12H  sodium chloride, 10 mL,  Intravenous, Q12H      Continuous Infusions:lactated ringers, 100 mL/hr, Last Rate: 100 mL/hr (04/30/21 8086)      PRN Meds:.•  acetaminophen  •  diphenhydrAMINE  •  melatonin  •  ondansetron  •  sodium chloride  •  sodium chloride    Assessment/Plan   Assessment & Plan     Active Hospital Problems    Diagnosis  POA   • **GI bleed [K92.2]  Yes   • DVT (deep venous thrombosis) (CMS/HCC) [I82.409]  Yes   • Essential hypertension [I10]  Yes   • Hyperglycemia [R73.9]  Yes   • Chronic anticoagulation [Z79.01]  Not Applicable      Resolved Hospital Problems   No resolved problems to display.        Brief Hospital Course to date:  Sahil Motta is a 60 y.o. male with past medical history of hypertension, recurrent DVT felt to be from prolonged travel, chronic anticoagulation with Eliquis who presented to the ER with complaints of GI bleed.    GI bleed:  - holding Eliquis, patient continues to have bloody BMs this morning  - hemoglobin continues to trend down, monitor q6hrs, transfuse PRN <7  - protonix IV BID  - consult to GI, planning EGD this AM     Hyperglycemia:  - no history of DM  - a1c 5.8     Hypertension:  - holding lisinopril. BP labile. 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 Holding as above.  - last DVT > 1 year ago and felt to be provoked from long travel     DVT prophylaxis:  mechanical    Disposition: I expect the patient to be discharged TBD    CODE STATUS:   Code Status and Medical Interventions:   Ordered at: 04/30/21 5061     Level Of Support Discussed With:    Patient     Code Status:    CPR     Medical Interventions (Level of Support Prior to Arrest):    Full       Liz Valentin,   05/01/21

## 2021-05-02 ENCOUNTER — PATIENT MESSAGE (OUTPATIENT)
Dept: GASTROENTEROLOGY | Facility: CLINIC | Age: 61
End: 2021-05-02

## 2021-05-02 VITALS
RESPIRATION RATE: 16 BRPM | HEIGHT: 69 IN | DIASTOLIC BLOOD PRESSURE: 91 MMHG | SYSTOLIC BLOOD PRESSURE: 137 MMHG | HEART RATE: 83 BPM | OXYGEN SATURATION: 95 % | BODY MASS INDEX: 38.39 KG/M2 | TEMPERATURE: 98.6 F | WEIGHT: 259.2 LBS

## 2021-05-02 LAB
ANION GAP SERPL CALCULATED.3IONS-SCNC: 7 MMOL/L (ref 5–15)
BASOPHILS # BLD AUTO: 0.03 10*3/MM3 (ref 0–0.2)
BASOPHILS NFR BLD AUTO: 0.5 % (ref 0–1.5)
BUN SERPL-MCNC: 26 MG/DL (ref 6–20)
BUN/CREAT SERPL: 25 (ref 7–25)
CALCIUM SPEC-SCNC: 8.2 MG/DL (ref 8.6–10.5)
CHLORIDE SERPL-SCNC: 109 MMOL/L (ref 98–107)
CO2 SERPL-SCNC: 25 MMOL/L (ref 22–29)
CREAT SERPL-MCNC: 1.04 MG/DL (ref 0.76–1.27)
DEPRECATED RDW RBC AUTO: 47.8 FL (ref 37–54)
EOSINOPHIL # BLD AUTO: 0.13 10*3/MM3 (ref 0–0.4)
EOSINOPHIL NFR BLD AUTO: 2 % (ref 0.3–6.2)
ERYTHROCYTE [DISTWIDTH] IN BLOOD BY AUTOMATED COUNT: 14 % (ref 12.3–15.4)
GFR SERPL CREATININE-BSD FRML MDRD: 73 ML/MIN/1.73
GLUCOSE SERPL-MCNC: 87 MG/DL (ref 65–99)
HCT VFR BLD AUTO: 24.3 % (ref 37.5–51)
HCT VFR BLD AUTO: 25.8 % (ref 37.5–51)
HGB BLD-MCNC: 7.4 G/DL (ref 13–17.7)
HGB BLD-MCNC: 7.9 G/DL (ref 13–17.7)
IMM GRANULOCYTES # BLD AUTO: 0.01 10*3/MM3 (ref 0–0.05)
IMM GRANULOCYTES NFR BLD AUTO: 0.2 % (ref 0–0.5)
LYMPHOCYTES # BLD AUTO: 2.14 10*3/MM3 (ref 0.7–3.1)
LYMPHOCYTES NFR BLD AUTO: 33 % (ref 19.6–45.3)
MCH RBC QN AUTO: 28.9 PG (ref 26.6–33)
MCHC RBC AUTO-ENTMCNC: 30.5 G/DL (ref 31.5–35.7)
MCV RBC AUTO: 94.9 FL (ref 79–97)
MONOCYTES # BLD AUTO: 0.45 10*3/MM3 (ref 0.1–0.9)
MONOCYTES NFR BLD AUTO: 6.9 % (ref 5–12)
NEUTROPHILS NFR BLD AUTO: 3.73 10*3/MM3 (ref 1.7–7)
NEUTROPHILS NFR BLD AUTO: 57.4 % (ref 42.7–76)
NRBC BLD AUTO-RTO: 0 /100 WBC (ref 0–0.2)
PLATELET # BLD AUTO: 187 10*3/MM3 (ref 140–450)
PMV BLD AUTO: 11.4 FL (ref 6–12)
POTASSIUM SERPL-SCNC: 4.1 MMOL/L (ref 3.5–5.2)
RBC # BLD AUTO: 2.56 10*6/MM3 (ref 4.14–5.8)
SODIUM SERPL-SCNC: 141 MMOL/L (ref 136–145)
WBC # BLD AUTO: 6.49 10*3/MM3 (ref 3.4–10.8)

## 2021-05-02 PROCEDURE — 80048 BASIC METABOLIC PNL TOTAL CA: CPT | Performed by: INTERNAL MEDICINE

## 2021-05-02 PROCEDURE — 99239 HOSP IP/OBS DSCHRG MGMT >30: CPT | Performed by: INTERNAL MEDICINE

## 2021-05-02 PROCEDURE — 85025 COMPLETE CBC W/AUTO DIFF WBC: CPT | Performed by: INTERNAL MEDICINE

## 2021-05-02 PROCEDURE — 99232 SBSQ HOSP IP/OBS MODERATE 35: CPT | Performed by: NURSE PRACTITIONER

## 2021-05-02 PROCEDURE — 85018 HEMOGLOBIN: CPT | Performed by: INTERNAL MEDICINE

## 2021-05-02 PROCEDURE — 25010000002 CYANOCOBALAMIN PER 1000 MCG: Performed by: NURSE PRACTITIONER

## 2021-05-02 PROCEDURE — 85014 HEMATOCRIT: CPT | Performed by: INTERNAL MEDICINE

## 2021-05-02 RX ORDER — PANTOPRAZOLE SODIUM 40 MG/1
40 TABLET, DELAYED RELEASE ORAL
Status: DISCONTINUED | OUTPATIENT
Start: 2021-05-02 | End: 2021-05-02 | Stop reason: HOSPADM

## 2021-05-02 RX ORDER — PANTOPRAZOLE SODIUM 40 MG/1
40 TABLET, DELAYED RELEASE ORAL
Qty: 60 TABLET | Refills: 0 | Status: SHIPPED | OUTPATIENT
Start: 2021-05-02 | End: 2021-06-01

## 2021-05-02 RX ORDER — CETIRIZINE HYDROCHLORIDE 10 MG/1
10 TABLET ORAL DAILY
COMMUNITY

## 2021-05-02 RX ORDER — SODIUM CHLORIDE, SODIUM LACTATE, POTASSIUM CHLORIDE, CALCIUM CHLORIDE 600; 310; 30; 20 MG/100ML; MG/100ML; MG/100ML; MG/100ML
30 INJECTION, SOLUTION INTRAVENOUS CONTINUOUS PRN
Status: DISCONTINUED | OUTPATIENT
Start: 2021-05-02 | End: 2021-05-02 | Stop reason: HOSPADM

## 2021-05-02 RX ORDER — CYANOCOBALAMIN 1000 UG/ML
1000 INJECTION, SOLUTION INTRAMUSCULAR; SUBCUTANEOUS
Status: DISCONTINUED | OUTPATIENT
Start: 2021-05-02 | End: 2021-05-02 | Stop reason: HOSPADM

## 2021-05-02 RX ORDER — HYDROCORTISONE ACETATE 25 MG/1
25 SUPPOSITORY RECTAL 2 TIMES DAILY
Qty: 8 SUPPOSITORY | Refills: 0 | Status: SHIPPED | OUTPATIENT
Start: 2021-05-02 | End: 2021-05-06

## 2021-05-02 RX ADMIN — PANTOPRAZOLE SODIUM 40 MG: 40 INJECTION, POWDER, FOR SOLUTION INTRAVENOUS at 08:35

## 2021-05-02 RX ADMIN — CYANOCOBALAMIN 1000 MCG: 1000 INJECTION, SOLUTION INTRAMUSCULAR; SUBCUTANEOUS at 11:04

## 2021-05-02 RX ADMIN — SODIUM CHLORIDE, PRESERVATIVE FREE 10 ML: 5 INJECTION INTRAVENOUS at 08:36

## 2021-05-02 RX ADMIN — HYDROCORTISONE ACETATE 25 MG: 25 SUPPOSITORY RECTAL at 08:36

## 2021-05-02 NOTE — PLAN OF CARE
Problem: Adult Inpatient Plan of Care  Goal: Plan of Care Review  Outcome: Ongoing, Progressing  Goal: Patient-Specific Goal (Individualized)  Outcome: Ongoing, Progressing  Goal: Absence of Hospital-Acquired Illness or Injury  Outcome: Ongoing, Progressing  Intervention: Identify and Manage Fall Risk  Recent Flowsheet Documentation  Taken 5/2/2021 0000 by Hunt, Kathy E, RN  Safety Promotion/Fall Prevention:   activity supervised   assistive device/personal items within reach   clutter free environment maintained   lighting adjusted   fall prevention program maintained   nonskid shoes/slippers when out of bed   room organization consistent   safety round/check completed   toileting scheduled  Taken 5/1/2021 2034 by Kathy Hunt RN  Safety Promotion/Fall Prevention:   activity supervised   clutter free environment maintained   assistive device/personal items within reach   fall prevention program maintained   lighting adjusted   nonskid shoes/slippers when out of bed   room organization consistent   safety round/check completed   toileting scheduled  Intervention: Prevent Skin Injury  Recent Flowsheet Documentation  Taken 5/2/2021 0000 by Kathy Hunt RN  Body Position: position changed independently  Skin Protection: adhesive use limited  Taken 5/1/2021 2034 by Kathy Hunt RN  Body Position: position changed independently  Skin Protection: adhesive use limited  Intervention: Prevent and Manage VTE (venous thromboembolism) Risk  Recent Flowsheet Documentation  Taken 5/2/2021 0000 by Kathy Hunt RN  VTE Prevention/Management:   bilateral   sequential compression devices off   patient refused intervention  Taken 5/1/2021 2034 by Kathy Hunt RN  VTE Prevention/Management:   bilateral   sequential compression devices off  Intervention: Prevent Infection  Recent Flowsheet Documentation  Taken 5/2/2021 0000 by Kathy Hunt RN  Infection Prevention: rest/sleep  promoted  Taken 5/1/2021 2034 by Kathy Hunt RN  Infection Prevention: rest/sleep promoted  Goal: Optimal Comfort and Wellbeing  Outcome: Ongoing, Progressing  Intervention: Provide Person-Centered Care  Recent Flowsheet Documentation  Taken 5/1/2021 2034 by Kathy Hunt RN  Trust Relationship/Rapport:   care explained   choices provided   empathic listening provided   emotional support provided   questions encouraged   questions answered   reassurance provided   thoughts/feelings acknowledged  Goal: Readiness for Transition of Care  Outcome: Ongoing, Progressing   Goal Outcome Evaluation:

## 2021-05-02 NOTE — PROGRESS NOTES
"GI Daily Progress Note  Subjective:    Chief Complaint: Follow-up GI bleed    Mr. Motta is resting comfortably in bed no acute distress at time of exam.  Notes he had a single episode of a bloody stool overnight but thinks it was related to the prior days bleeding and his hemorrhoids.  Does not endorse any new or worsening GI symptoms.  Does not endorse any pain.  Inquires on safe medications to use for mild to moderate pain that is not NSAIDs.    Objective:    /88 (BP Location: Left arm, Patient Position: Lying)   Pulse 120   Temp 98.2 °F (36.8 °C) (Oral)   Resp 18   Ht 175.3 cm (69\")   Wt 118 kg (259 lb 3.2 oz)   SpO2 95%   BMI 38.28 kg/m²     Physical Exam  Vitals and nursing note reviewed.   Constitutional:       General: He is not in acute distress.     Appearance: Normal appearance. He is obese. He is not ill-appearing or toxic-appearing.      Comments: Pleasantly conversant, no acute distress   HENT:      Head: Normocephalic and atraumatic.      Mouth/Throat:      Mouth: Mucous membranes are moist.      Pharynx: Oropharynx is clear. No oropharyngeal exudate.   Eyes:      General: No scleral icterus.     Extraocular Movements: Extraocular movements intact.      Conjunctiva/sclera: Conjunctivae normal.      Pupils: Pupils are equal, round, and reactive to light.   Cardiovascular:      Rate and Rhythm: Normal rate and regular rhythm.      Pulses: Normal pulses.      Heart sounds: Normal heart sounds.   Pulmonary:      Effort: Pulmonary effort is normal. No respiratory distress.      Breath sounds: Normal breath sounds.   Abdominal:      General: Abdomen is flat. Bowel sounds are normal. There is no distension.      Palpations: Abdomen is soft. There is no mass.      Tenderness: There is no abdominal tenderness. There is no guarding or rebound.      Hernia: No hernia is present.   Skin:     General: Skin is warm and dry.      Capillary Refill: Capillary refill takes less than 2 seconds.      " Coloration: Skin is not jaundiced or pale.   Neurological:      General: No focal deficit present.      Mental Status: He is alert and oriented to person, place, and time.   Psychiatric:         Mood and Affect: Mood normal.         Behavior: Behavior normal.         Thought Content: Thought content normal.         Judgment: Judgment normal.       Lab  I have personally reviewed most recent cardiac tracings, lab results and radiology images and interpretations and agree with findings.    Lab Results   Component Value Date    WBC 6.49 05/02/2021    HGB 7.9 (L) 05/02/2021    HGB 7.4 (L) 05/02/2021    HGB 8.4 (L) 05/01/2021    MCV 94.9 05/02/2021     05/02/2021       Lab Results   Component Value Date    GLUCOSE 87 05/02/2021    BUN 26 (H) 05/02/2021    CREATININE 1.04 05/02/2021    EGFRIFNONA 73 05/02/2021    BCR 25.0 05/02/2021     05/02/2021    K 4.1 05/02/2021    CO2 25.0 05/02/2021    CALCIUM 8.2 (L) 05/02/2021    ALBUMIN 3.70 04/30/2021    ALKPHOS 86 04/30/2021    BILITOT 0.3 04/30/2021    ALT 11 04/30/2021    AST 16 04/30/2021     Esophagogastroduodenoscopy on 5/1/2021  >>> 2 ulcers found in gastric pouch; 2 endoscopic clips applied.  >>> Biopsy taken for Helicobacter pylori  Results for KELSEY ISIDRO GRAHAM (MRN [id number]) as of 5/2/2021 09:50   Ref. Range 4/30/2021 19:22   Vitamin B-12 Latest Ref Range: 211 - 946 pg/mL <150 (L)     Assessment:      GI bleed    DVT (deep venous thrombosis) (CMS/HCC)    Essential hypertension    Hyperglycemia    Chronic anticoagulation    Acute gastrointestinal bleeding of upper source, 2 ulcers in gastric pouch  Acute blood loss anemia, ongoing  Chronic NSAID use  History of Emma-en-Y gastric bypass  History of DVT, on anticoagulation  Chronic anticoagulation related to above  Vitamin B12 deficiency    Plan:  >>> Patient's hemoglobin trended down overnight with subsequent recheck of a hemoglobin at 7.9.  Of note, BUN decreased to 26 overnight.   -If hemoglobin trends  down further, recommend colonoscopy.  >>> Patient instructed to discontinue ibuprofen use; instructed to use acetaminophen for mild to moderate pain.  >>> Continue twice daily PPI for 30 days and then daily thereafter.  >>> Continue Anusol suppositories twice a day for a total of 5 days.  >>> Patient is vitamin B12 deficient.  We will start replacement therapy; will need to follow-up with his PCP for further management thereof.    If repeat hemoglobin stable, okay for patient to be discharged to home.    Enrique Yousif, SOFIYA  05/02/21  09:47 EDT

## 2021-05-02 NOTE — DISCHARGE SUMMARY
[facility]  DISCHARGE SUMMARY    Patient Name: Sahil Motta  : 1960  MRN: [id number]    Date of Admission: 2021  7:47 PM  Date of Discharge:  2021  Primary Care Physician: Khris Gallagher MD    Consults     Date and Time Order Name Status Description    2021 11:05 PM Inpatient Gastroenterology Consult Completed           Hospital Course     Presenting Problem:   BRBPR (bright red blood per rectum) [K62.5]    Active Hospital Problems    Diagnosis  POA   • **GI bleed [K92.2]  Yes   • DVT (deep venous thrombosis) (CMS/HCC) [I82.409]  Yes   • Essential hypertension [I10]  Yes   • Hyperglycemia [R73.9]  Yes   • Chronic anticoagulation [Z79.01]  Not Applicable      Resolved Hospital Problems   No resolved problems to display.          Hospital Course:  Sahil Motta is a 60 y.o. male  with past medical history of hypertension, recurrent DVT felt to be from prolonged travel, chronic anticoagulation with Eliquis who presented to the ER with complaints of GI bleed.    Acute Upper GIB   Hemorrhoidal Bleeding  Acute blood Loss anemia  - s/p EGD/Push Enteroscopy on  showed 2 ulcers in pouch. Clips X 2 were placed. Push to pylorus was normal. Recommended BID PPI x 30 days, then daily.   - hemoglobin low but stable, patient without symptoms.   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 Recommend holding for total 7 days prior to resumption given active hemorrhoidal bleeding.  - last DVT > 1 year ago and felt to be provoked from long travel     Discharge Follow Up Recommendations for outpatient labs/diagnostics:  - f/u with PCP in one week, recommend discussion regarding management of B12 deficiency, received 1000mcg dose IM while inpatient  on 5/2/21  - f/u with Dr. Hanna as needed for C-scope  - PPI BID x 30 days then daily   - Anusol x 5 days  - hold Eliquis for one week, then resume      Day of Discharge     HPI:   Patient resting in bed. Still having some mild BRBPR but overall improved. No further stomach pain. No dizziness, lightheadedness. No SOA.    Review of Systems  Gen- No fevers, chills  CV- No chest pain, palpitations  Resp- No cough, dyspnea  GI- No N/V/D, abd pain    Vital Signs:   Temp:  [97.2 °F (36.2 °C)-98.6 °F (37 °C)] 98.6 °F (37 °C)  Heart Rate:  [] 83  Resp:  [16-18] 16  BP: (111-144)/(66-91) 137/91     Physical Exam:  Constitutional: No acute distress, awake, alert  HENT: NCAT, mucous membranes moist  Respiratory: Clear to auscultation bilaterally, respiratory effort normal   Cardiovascular: RRR, no murmurs, rubs, or gallops  Gastrointestinal: Positive bowel sounds, soft, nontender, nondistended  Musculoskeletal: No bilateral ankle edema  Psychiatric: Appropriate affect, cooperative  Neurologic: Oriented x 3, strength symmetric in all extremities, Cranial Nerves grossly intact to confrontation, speech clear  Skin: No rashes      Pertinent  and/or Most Recent Results     LAB RESULTS:      Lab 05/02/21  0915 05/02/21  0455 05/01/21  0914 05/01/21  0129 04/30/21  2329 04/30/21  2233 04/30/21  1922   WBC  --  6.49  --   --   --   --  10.52   HEMOGLOBIN 7.9* 7.4* 8.4* 8.5* 9.8*  --  10.1*   HEMATOCRIT 25.8* 24.3* 27.6* 27.2* 32.2*  --  32.8*   PLATELETS  --  187  --   --   --   --  276   NEUTROS ABS  --  3.73  --   --   --   --  8.77*   IMMATURE GRANS (ABS)  --  0.01  --   --   --   --  0.05   LYMPHS ABS  --  2.14  --   --   --   --  1.24   MONOS ABS  --  0.45  --   --   --   --  0.42   EOS ABS  --  0.13  --   --   --   --  0.01   MCV  --  94.9  --   --   --   --  92.1   LACTATE  --   --   --  1.4  --  2.1*  --          Lab 05/02/21  0455 05/01/21  0129 04/30/21  1922   SODIUM 141 142 144   POTASSIUM 4.1 4.1 4.5   CHLORIDE  109* 110* 109*   CO2 25.0 24.0 24.0   ANION GAP 7.0 8.0 11.0   BUN 26* 39* 42*   CREATININE 1.04 0.94 1.10   GLUCOSE 87 113* 156*   CALCIUM 8.2* 8.2* 8.8   IONIZED CALCIUM  --  1.29  --    HEMOGLOBIN A1C  --  5.80*  --          Lab 04/30/21 1922   TOTAL PROTEIN 6.3   ALBUMIN 3.70   GLOBULIN 2.6   ALT (SGPT) 11   AST (SGOT) 16   BILIRUBIN 0.3   ALK PHOS 86         Lab 04/30/21 1922   PROBNP 49.7   TROPONIN T <0.010             Lab 04/30/21 2329 04/30/21 2039 04/30/21 1922   IRON  --   --  109   IRON SATURATION  --   --  24   TIBC  --   --  454   TRANSFERRIN  --   --  305   FERRITIN 22.81*  --   --    FOLATE  --   --  4.17*   VITAMIN B 12  --   --  <150*   ABO TYPING  --  A A   RH TYPING  --  Positive Positive   ANTIBODY SCREEN  --   --  Negative         Brief Urine Lab Results     None        Microbiology Results (last 10 days)     Procedure Component Value - Date/Time    COVID PRE-OP / PRE-PROCEDURE SCREENING ORDER (NO ISOLATION) - Swab, Nasopharynx [[id number]]  (Normal) Collected: 04/30/21 2219    Lab Status: Final result Specimen: Swab from Nasopharynx Updated: 04/30/21 2315    Narrative:      The following orders were created for panel order COVID PRE-OP / PRE-PROCEDURE SCREENING ORDER (NO ISOLATION) - Swab, Nasopharynx.  Procedure                               Abnormality         Status                     ---------                               -----------         ------                     COVID-19 and FLU A/B PCR...[[id number]]  Normal              Final result                 Please view results for these tests on the individual orders.    COVID-19 and FLU A/B PCR - Swab, Nasopharynx [[id number]]  (Normal) Collected: 04/30/21 2219    Lab Status: Final result Specimen: Swab from Nasopharynx Updated: 04/30/21 2315     COVID19 Not Detected     Influenza A PCR Not Detected     Influenza B PCR Not Detected    Narrative:      Fact sheet for providers: https://www.fda.gov/media/301386/download    Fact sheet for  patients: https://www.[employer].gov/media/848917/download    Test performed by [facility].          CT Abdomen Pelvis With Contrast    Result Date: 4/30/2021  CT Abdomen Pelvis W INDICATION: Abdominal cramping. Bright red blood per rectum. In the emergency compartment. TECHNIQUE: CT of the abdomen and pelvis with IV contrast. Coronal and sagittal reconstructions were obtained.  Radiation dose reduction techniques included automated exposure control or exposure modulation based on body size. Count of known CT and cardiac nuc med studies performed in previous 12 months: 0. COMPARISON: None available. FINDINGS: Abdomen: Included lung bases are clear. Normal caliber aorta. There is atherosclerotic change. Postop changes involving the stomach. Spleen adrenal glands and pancreas are negative and the gallbladder unremarkable. Mild hepatic steatosis. Kidneys unremarkable. No adenopathy. Pelvis: Negative bladder and prostate gland is unremarkable. No drainable fluid collection. Diverticulosis. The bowel is nonobstructed and there is no focal inflammatory change in the bowel. There is redundancy of the ascending colon. Appendix not clearly identified but no secondary signs of appendicitis or inflammatory change within the right lower quadrant/midabdomen. Negative inguinal canals. No suspicious bone lesion. Delayed phase images demonstrate no additional findings.  image negative.     1. No clearly acute process in the abdomen or pelvis. Diverticulosis but no CT evidence of acute diverticulitis. Appendix not identified but no secondary signs of appendicitis. 2. Status post gastric surgery. 3. Hepatic steatosis. Signer Name: Devyn Blanchard MD  Signed: 4/30/2021 9:48 PM  Workstation Name: [facility]  [facility]    XR Chest 1 View    Result Date: 4/30/2021  CR Chest 1 Vw INDICATION: Shortness of breath COMPARISON:  None available. FINDINGS: Single portable AP view(s) of the chest.  The heart and mediastinal  contours are normal. The lungs are clear. No pneumothorax or pleural effusion.     No acute cardiopulmonary findings. Signer Name: Dinah Lloyd MD  Signed: 4/30/2021 8:21 PM  Workstation Name: [id number]  [facility] [place]                  Plan for Follow-up of Pending Labs/Results:  Pending Labs     Order Current Status    Tissue Pathology Exam Collected (05/01/21 1200)        Discharge Details        Discharge Medications      New Medications      Instructions Start Date   hydrocortisone 25 MG suppository  Commonly known as: ANUSOL-HC   25 mg, Rectal, 2 Times Daily      pantoprazole 40 MG EC tablet  Commonly known as: PROTONIX   40 mg, Oral, 2 Times Daily Before Meals         Continue These Medications      Instructions Start Date   Benicar 20 MG tablet  Generic drug: olmesartan   20 mg, Oral, Daily      ELIQUIS PO   5 mg, Oral, 2 times daily      FIBER-CAPS PO   1 capsule, Oral      lisinopril 20 MG tablet  Commonly known as: PRINIVIL,ZESTRIL   20 mg, Oral, Daily      PROZAC PO   Oral             Allergies   Allergen Reactions   • Percocet [Oxycodone-Acetaminophen]          Discharge Disposition:  Home or Self Care    Diet:  Hospital:  Diet Order   Procedures   • Diet Regular; Post Gastrectomy       Activity:  - as tolerated    Restrictions or Other Recommendations:  -none       CODE STATUS:    Code Status and Medical Interventions:   Ordered at: 04/30/21 2226     Level Of Support Discussed With:    Patient     Code Status:    CPR     Medical Interventions (Level of Support Prior to Arrest):    Full       No future appointments.              Liz Valentin DO  05/02/21      Time Spent on Discharge:  I spent  35  minutes on this discharge activity which included: face-to-face encounter with the patient, reviewing the data in the system, coordination of the care with the nursing staff as well as consultants, documentation, and entering orders.

## 2021-05-02 NOTE — PLAN OF CARE
Problem: Adult Inpatient Plan of Care  Goal: Plan of Care Review  Outcome: Adequate for Care Transition  Goal: Patient-Specific Goal (Individualized)  Outcome: Adequate for Care Transition  Goal: Absence of Hospital-Acquired Illness or Injury  Outcome: Adequate for Care Transition  Intervention: Identify and Manage Fall Risk  Recent Flowsheet Documentation  Taken 5/2/2021 1200 by Diana Rueda RN  Safety Promotion/Fall Prevention:   activity supervised   fall prevention program maintained   nonskid shoes/slippers when out of bed   room organization consistent   safety round/check completed   toileting scheduled  Taken 5/2/2021 1000 by Diana Rueda RN  Safety Promotion/Fall Prevention:   activity supervised   fall prevention program maintained   nonskid shoes/slippers when out of bed   toileting scheduled   room organization consistent   safety round/check completed  Taken 5/2/2021 0840 by Diana Rueda RN  Safety Promotion/Fall Prevention:   activity supervised   fall prevention program maintained   nonskid shoes/slippers when out of bed   room organization consistent   safety round/check completed   toileting scheduled  Intervention: Prevent Skin Injury  Recent Flowsheet Documentation  Taken 5/2/2021 1200 by Diana Rueda RN  Body Position: position changed independently  Taken 5/2/2021 0840 by Diana Rueda RN  Body Position: position changed independently  Skin Protection:   adhesive use limited   incontinence pads utilized  Intervention: Prevent and Manage VTE (venous thromboembolism) Risk  Recent Flowsheet Documentation  Taken 5/2/2021 0840 by Diana Rueda RN  VTE Prevention/Management:   bilateral   sequential compression devices off  Goal: Optimal Comfort and Wellbeing  Outcome: Adequate for Care Transition  Intervention: Provide Person-Centered Care  Recent Flowsheet Documentation  Taken 5/2/2021 0840 by Diana Rueda RN  Trust Relationship/Rapport:   care explained   emotional  support provided   choices provided   empathic listening provided   questions answered   questions encouraged   thoughts/feelings acknowledged   reassurance provided  Goal: Readiness for Transition of Care  Outcome: Adequate for Care Transition     Problem: Adjustment to Illness (Gastrointestinal Bleeding)  Goal: Optimal Coping with Acute Illness  Outcome: Adequate for Care Transition  Intervention: Optimize Psychosocial Response to Unexpected Illness  Recent Flowsheet Documentation  Taken 5/2/2021 0840 by Diana Rueda, RN  Supportive Measures:   active listening utilized   positive reinforcement provided   verbalization of feelings encouraged     Problem: Bleeding (Gastrointestinal Bleeding)  Goal: Hemostasis  Outcome: Adequate for Care Transition  Intervention: Manage Gastrointestinal Bleeding  Recent Flowsheet Documentation  Taken 5/2/2021 0840 by Diana Rueda, RN  Environmental Support:   calm environment promoted   environmental consistency promoted   Goal Outcome Evaluation:

## 2021-05-03 NOTE — TELEPHONE ENCOUNTER
From: Sahil Motta  To: Matheus Duong MD  Sent: 5/2/2021 6:46 PM EDT  Subject: Non-Urgent Medical Question    Dr Duong,  Is it possible to get a work excuse for this week. I was scheduled to fly to [place] for work. I just don’t think I would be up to flying.   Thank you got everything  Sahil Motta

## 2021-05-04 LAB
BH BB BLOOD EXPIRATION DATE: NORMAL
BH BB BLOOD EXPIRATION DATE: NORMAL
BH BB BLOOD TYPE BARCODE: 6200
BH BB BLOOD TYPE BARCODE: 6200
BH BB DISPENSE STATUS: NORMAL
BH BB DISPENSE STATUS: NORMAL
BH BB PRODUCT CODE: NORMAL
BH BB PRODUCT CODE: NORMAL
BH BB UNIT NUMBER: NORMAL
BH BB UNIT NUMBER: NORMAL
CROSSMATCH INTERPRETATION: NORMAL
CROSSMATCH INTERPRETATION: NORMAL
CYTO UR: NORMAL
LAB AP CASE REPORT: NORMAL
LAB AP CLINICAL INFORMATION: NORMAL
PATH REPORT.FINAL DX SPEC: NORMAL
PATH REPORT.GROSS SPEC: NORMAL
UNIT  ABO: NORMAL
UNIT  ABO: NORMAL
UNIT  RH: NORMAL
UNIT  RH: NORMAL

## (undated) DEVICE — INTRO ACCSR BLNT TP

## (undated) DEVICE — SINGLE-USE BIOPSY FORCEPS: Brand: RADIAL JAW 4

## (undated) DEVICE — SUCTION CANISTER, 1000CC,SAFELINER: Brand: DEROYAL

## (undated) DEVICE — SPNG VERSALON 4X4 4PLY NONSTRL LF BG/200

## (undated) DEVICE — THE BITE BLOCK MAXI, LATEX FREE STRAP IS USED TO PROTECT THE ENDOSCOPE INSERTION TUBE FROM BEING BITTEN BY THE PATIENT.

## (undated) DEVICE — TUBING, SUCTION, 1/4" X 10', STRAIGHT: Brand: MEDLINE

## (undated) DEVICE — KT ORCA ORCAPOD DISP STRL

## (undated) DEVICE — HYBRID CO2 TUBING/CAP SET FOR OLYMPUS® SCOPES & CO2 SOURCE: Brand: ERBE

## (undated) DEVICE — SOL IRR H2O BTL 1000ML STRL

## (undated) DEVICE — LUBE GEL ENDOGLIDE 1.1OZ

## (undated) DEVICE — SYR LUERLOK 50ML

## (undated) DEVICE — CONTN GRAD MEAS TRIANG 32OZ BLK